# Patient Record
(demographics unavailable — no encounter records)

---

## 2025-02-05 NOTE — PHYSICAL EXAM
[Soft] : soft [Non-tender] : non-tender [Examination Of The Breasts] : a normal appearance [No Masses] : no breast masses were palpable [Labia Majora] : normal [Labia Minora] : normal [Normal] : normal [Enlarged ___ wks] : enlarged [unfilled] ~Uweeks [Uterine Adnexae] : normal [FreeTextEntry5] : Pulled up

## 2025-02-05 NOTE — HISTORY OF PRESENT ILLNESS
[FreeTextEntry1] : SONJA DIA 48 YO, presents for Annual & Irregular menses G 2   P 2002 - 2 C/S LMP: 12/2024 - Irregular menses (every 2-3 months) Menopausal discussion. From hot flashes to mood swings. Sexually active No Medication No family history of breast cancer To do monthly SBE. Has mammogram referral from PCP. Has history of fibroids. For pelvic sonogram today. To f/u for results & discussion.

## 2025-02-26 NOTE — HISTORY OF PRESENT ILLNESS
[FreeTextEntry1] : SONJA DIA 48 YO, presents for Results G 2 P 2002 - 2 C/S LMP: 12/2024 - Irregular menses (every 2-3 months) Sexually active No Medication PAP- WNL, NG & CT- Not detected.  Blood results- Reviewed. FSH- menopausal range, HCG & TSH- WNR.  Pelvic Sonogram- Reviewed.  Endometrial thickness- 6.9 mm 3 cm sub serosal Fibroid.  Right ovary with 2.8 cm simple cyst. Left ovary with 5.8 cm cyst with internal debris.  No family h/o ovarian cancer, no pelvic pain, no dyspareunia.  To repeat pelvic sonogram in 5/2025 & F/U.

## 2025-03-05 NOTE — HISTORY OF PRESENT ILLNESS
[FreeTextEntry1] : SONJA DIA 46 YO, presents for Results & discussion G 2 P 2002 - 2 C/S LMP: 12/2024 - Irregular menses (every 2-3 months) Sexually active No Medication. In with her sister.  Patient with about 6 cm cyst in left ovary. Patient also with a 3 cm. sub serosal fibroid. Blood results- Reviewed. CA-125 &  Overa - Elevated.  Patient to see DR. Dunn for further evaluation. Importance of f/u stressed to avoid progression.